# Patient Record
(demographics unavailable — no encounter records)

---

## 2025-06-19 NOTE — ASSESSMENT
[FreeTextEntry1] : Plan: PSA 9 months RTC 9 months  All images and labs were reviewed and explained thoroughly All the patient's questions were answered to the best of my ability.

## 2025-06-19 NOTE — PHYSICAL EXAM
[General Appearance - Well Developed] : well developed [General Appearance - Well Nourished] : well nourished [Edema] : no peripheral edema [] : no respiratory distress [Abdomen Soft] : soft [Urinary Bladder Findings] : the bladder was normal on palpation [Normal Station and Gait] : the gait and station were normal for the patient's age [No Focal Deficits] : no focal deficits [Oriented To Time, Place, And Person] : oriented to person, place, and time [No Palpable Adenopathy] : no palpable adenopathy [de-identified] : NALLELY: 4x4 smooth

## 2025-06-19 NOTE — HISTORY OF PRESENT ILLNESS
[FreeTextEntry1] : 49 yo male, hx of RMSF, Lyme disease, diverticulitis -- recently had a UTI that was treated early summer POC urine negative On CT abdomen ([facility]): slightly enlarged prostate   also has an elevated PSA:(2024): PSA=8 ------> repeated one month later PSA=5.7 Fam Hx: neg PCa MRI prostate (nov 2024): PS=44 cc // PIRADS 4 lesion left PMPZ -- neg JORDIN/NVB/SVI/LN IPSS=8 / ALCIDES=25  Dec 2024: TP fusion Bx: benign prostate tissue June 2025: PSA= 1.2